# Patient Record
Sex: FEMALE | Race: WHITE | NOT HISPANIC OR LATINO
[De-identification: names, ages, dates, MRNs, and addresses within clinical notes are randomized per-mention and may not be internally consistent; named-entity substitution may affect disease eponyms.]

---

## 2024-05-24 ENCOUNTER — APPOINTMENT (OUTPATIENT)
Dept: INTERNAL MEDICINE | Facility: CLINIC | Age: 29
End: 2024-05-24
Payer: COMMERCIAL

## 2024-05-24 VITALS
TEMPERATURE: 97.3 F | HEIGHT: 72 IN | HEART RATE: 68 BPM | OXYGEN SATURATION: 98 % | DIASTOLIC BLOOD PRESSURE: 68 MMHG | WEIGHT: 218 LBS | SYSTOLIC BLOOD PRESSURE: 110 MMHG | BODY MASS INDEX: 29.53 KG/M2

## 2024-05-24 DIAGNOSIS — H66.90 OTITIS MEDIA, UNSPECIFIED, UNSPECIFIED EAR: ICD-10-CM

## 2024-05-24 PROCEDURE — 99203 OFFICE O/P NEW LOW 30 MIN: CPT

## 2024-05-24 NOTE — PLAN
[FreeTextEntry1] : 29F PMH depression presents for left ear pain and pressure starting 2 days ago.   # otitis media - 2 days with left ear pain and pressure and 2 weeks of sore throat - on exam with L ear TM appearing congested from behind, mild erythema around the ear canal, no signs of TM perforation or drainage, R ear normal. Mild erythematous mucus membranes, no exudates noted. No LN - she denies any hearing loss or pain on ear palpation or being in a body of water - denies fevers, chills, myalgias - can be coinciding with seasonal allergies but given duration of her symptoms and history of ear infection in the past will prescribe antibiotics - rx sent for augmentin BID for 7 days - recommended antihistamines OTC for allergies as needed - Tylenol/Advil recommended for pain/fevers

## 2024-05-24 NOTE — REVIEW OF SYSTEMS
[Earache] : earache [Hearing Loss] : no hearing loss [Nosebleed] : no nosebleeds [Hoarseness] : hoarseness [Nasal Discharge] : no nasal discharge [Sore Throat] : sore throat [Postnasal Drip] : postnasal drip [Negative] : Respiratory [FreeTextEntry4] : left ear pain and pressure

## 2024-05-24 NOTE — HEALTH RISK ASSESSMENT
[Yes] : Yes [2 - 4 times a month (2 pts)] : 2-4 times a month (2 points) [3 or 4 (1 pt)] : 3 or 4  (1 point) [Never (0 pts)] : Never (0 points) [No] : In the past 12 months have you used drugs other than those required for medical reasons? No [No falls in past year] : Patient reported no falls in the past year [0] : 1) Little interest or pleasure doing things: Not at all (0) [1] : 2) Feeling down, depressed, or hopeless for several days (1) [PHQ-2 Negative - No further assessment needed] : PHQ-2 Negative - No further assessment needed [Audit-CScore] : 3 [ZQC2Oxxby] : 1 [Never] : Never

## 2024-05-24 NOTE — HISTORY OF PRESENT ILLNESS
[FreeTextEntry8] : 29F PMH depression presents for left ear pain and pressure starting 2 days ago. She notes having a sore throat for 2 weeks and attributed it to allergies but 2 days ago started to left ear pain and pressure and now feeling fluid in her ear with no reported drainage. She notes she thinks she had an ear infection in 12/2023 which lasted a month because she was uninsured and was never treated with antibiotics. She denies any hearing loss, pain in ear, being in bodies of water, fevers, chills, chest pain. She reports a sick contact being her father with URI symptoms but no ear pain. She moved to [place] from [place] 5 months ago to be closer to her father. She reports seasonal allergies but has not been taking antihistamines. She denies any difficulty breathing or swallowing and says the sore throat has been resolving. She has been taking Advil PRN.

## 2024-05-24 NOTE — PHYSICAL EXAM
[Normal] : affect was normal and insight and judgment were intact [de-identified] : L ear TM appearing congested from behind, mild erythema around the ear canal, no signs of TM perforation or drainage, R ear normal. Mild erythematous mucus membranes, no exudates noted. No LN

## 2024-06-05 ENCOUNTER — NON-APPOINTMENT (OUTPATIENT)
Age: 29
End: 2024-06-05

## 2024-06-05 ENCOUNTER — APPOINTMENT (OUTPATIENT)
Dept: OBGYN | Facility: CLINIC | Age: 29
End: 2024-06-05
Payer: COMMERCIAL

## 2024-06-05 VITALS
SYSTOLIC BLOOD PRESSURE: 116 MMHG | WEIGHT: 223 LBS | HEIGHT: 72 IN | DIASTOLIC BLOOD PRESSURE: 62 MMHG | BODY MASS INDEX: 30.2 KG/M2

## 2024-06-05 DIAGNOSIS — F31.70 BIPOLAR DISORDER, CURRENTLY IN REMISSION, MOST RECENT EPISODE UNSPECIFIED: ICD-10-CM

## 2024-06-05 DIAGNOSIS — Z78.9 OTHER SPECIFIED HEALTH STATUS: ICD-10-CM

## 2024-06-05 DIAGNOSIS — Z11.3 ENCOUNTER FOR SCREENING FOR INFECTIONS WITH A PREDOMINANTLY SEXUAL MODE OF TRANSMISSION: ICD-10-CM

## 2024-06-05 DIAGNOSIS — Z01.419 ENCOUNTER FOR GYNECOLOGICAL EXAMINATION (GENERAL) (ROUTINE) W/OUT ABNORMAL FINDINGS: ICD-10-CM

## 2024-06-05 DIAGNOSIS — Z80.51 FAMILY HISTORY OF MALIGNANT NEOPLASM OF KIDNEY: ICD-10-CM

## 2024-06-05 DIAGNOSIS — Z12.4 ENCOUNTER FOR SCREENING FOR MALIGNANT NEOPLASM OF CERVIX: ICD-10-CM

## 2024-06-05 PROCEDURE — 99395 PREV VISIT EST AGE 18-39: CPT

## 2024-06-05 PROCEDURE — 99459 PELVIC EXAMINATION: CPT

## 2024-06-05 RX ORDER — VILAZODONE HYDROCHLORIDE 40 MG/1
40 TABLET, FILM COATED ORAL
Refills: 0 | Status: ACTIVE | COMMUNITY

## 2024-06-05 RX ORDER — AMOXICILLIN AND CLAVULANATE POTASSIUM 875; 125 MG/1; MG/1
875-125 TABLET, COATED ORAL
Qty: 14 | Refills: 0 | Status: DISCONTINUED | COMMUNITY
Start: 2024-05-24 | End: 2024-06-05

## 2024-06-05 NOTE — HISTORY OF PRESENT ILLNESS
[Patient reported PAP Smear was normal] : Patient reported PAP Smear was normal [Y] : Patient is sexually active [N] : Patient denies prior pregnancies [Menarche Age: ____] : age at menarche was [unfilled] [Yes] : Patient has concerns regarding sex [Currently Active] : currently active [Men] : men [TextBox_4] : Idalia is here for her annual exam and to establish care. She was referred by her friend Do Vargas.  She is originally from [place], lived in [place] for 6 years, now back to be near her father, who broke his hip recently. Her mother  from kidney cancer 5 years ago.    She has had sex only twice- once in January, and the second time in March.  During the March encounter she bled for a few minutes after sex. She thinks she tore her hymen.  She had a pap in - reportedly normal.     [PapSmeardate] : 01/2022 [TextBox_31] : as per pt  [LMPDate] : 5/16/24 [PGHxTotal] : 0 [FreeTextEntry1] : 5/16/24

## 2024-06-05 NOTE — PHYSICAL EXAM
[Appropriately responsive] : appropriately responsive [Alert] : alert [No Acute Distress] : no acute distress [Oriented x3] : oriented x3 [Chaperone Present] : A chaperone was present in the examining room during all aspects of the physical examination [65052] : A chaperone was present during the pelvic exam. [FreeTextEntry2] : IBIS Candelario [Soft] : soft [Non-tender] : non-tender [Non-distended] : non-distended [No Lesions] : no lesions [No Mass] : no mass [Examination Of The Breasts] : a normal appearance [No Masses] : no breast masses were palpable [Labia Majora] : normal [Labia Minora] : normal [Normal] : normal [Uterine Adnexae] : normal

## 2024-06-05 NOTE — PLAN
[FreeTextEntry1] : She was reassured her vaginal exam is normal.  The bleeding she had was likely from a small tear in the hymen. Should she experience any further bleeding with sex she was recommended to follow up, although she states she will likely not have sex again.  pap obtained along with gc.ct  self breast exams/awareness encouraged  pt has planned GI follow up.  Father has polyps, she also feels she has "GI issues".    she is motivated to be healthy and up to date with all screening as her mother did not go to the doctor regularly.

## 2024-06-06 LAB
C TRACH RRNA SPEC QL NAA+PROBE: NOT DETECTED
HPV HIGH+LOW RISK DNA PNL CVX: NOT DETECTED
N GONORRHOEA RRNA SPEC QL NAA+PROBE: NOT DETECTED
SOURCE TP AMPLIFICATION: NORMAL

## 2024-06-12 LAB — CYTOLOGY CVX/VAG DOC THIN PREP: NORMAL

## 2024-06-13 ENCOUNTER — APPOINTMENT (OUTPATIENT)
Dept: FAMILY MEDICINE | Facility: CLINIC | Age: 29
End: 2024-06-13
Payer: COMMERCIAL

## 2024-06-13 ENCOUNTER — NON-APPOINTMENT (OUTPATIENT)
Age: 29
End: 2024-06-13

## 2024-06-13 VITALS
SYSTOLIC BLOOD PRESSURE: 110 MMHG | BODY MASS INDEX: 30.2 KG/M2 | OXYGEN SATURATION: 97 % | WEIGHT: 223 LBS | TEMPERATURE: 97.3 F | DIASTOLIC BLOOD PRESSURE: 70 MMHG | HEART RATE: 63 BPM | HEIGHT: 72 IN

## 2024-06-13 DIAGNOSIS — Z13.220 ENCOUNTER FOR SCREENING FOR LIPOID DISORDERS: ICD-10-CM

## 2024-06-13 DIAGNOSIS — Z00.00 ENCOUNTER FOR GENERAL ADULT MEDICAL EXAMINATION W/OUT ABNORMAL FINDINGS: ICD-10-CM

## 2024-06-13 DIAGNOSIS — Z13.1 ENCOUNTER FOR SCREENING FOR DIABETES MELLITUS: ICD-10-CM

## 2024-06-13 DIAGNOSIS — Z82.49 FAMILY HISTORY OF ISCHEMIC HEART DISEASE AND OTHER DISEASES OF THE CIRCULATORY SYSTEM: ICD-10-CM

## 2024-06-13 DIAGNOSIS — Z13.29 ENCOUNTER FOR SCREENING FOR OTHER SUSPECTED ENDOCRINE DISORDER: ICD-10-CM

## 2024-06-13 PROCEDURE — 99385 PREV VISIT NEW AGE 18-39: CPT

## 2024-06-13 RX ORDER — CARIPRAZINE 1.5 MG/1
1.5 CAPSULE, GELATIN COATED ORAL
Refills: 0 | Status: ACTIVE | COMMUNITY

## 2024-06-13 NOTE — HISTORY OF PRESENT ILLNESS
[FreeTextEntry1] : cpe  [de-identified] : no chest pain, no sob, no cough, no fever, no dizziness, no abdominal pain, no n/v/d/c/melena/brbpr/hematuria/dysuria

## 2024-06-13 NOTE — HEALTH RISK ASSESSMENT
[Good] : ~his/her~  mood as  good [Yes] : Yes [Monthly or less (1 pt)] : Monthly or less (1 point) [1 or 2 (0 pts)] : 1 or 2 (0 points) [Never (0 pts)] : Never (0 points) [No] : In the past 12 months have you used drugs other than those required for medical reasons? No [None] : None [With Family] : lives with family [Employed] : employed [College] : College [Single] : single [Sexually Active] : sexually active [Feels Safe at Home] : Feels safe at home [Fully functional (bathing, dressing, toileting, transferring, walking, feeding)] : Fully functional (bathing, dressing, toileting, transferring, walking, feeding) [Fully functional (using the telephone, shopping, preparing meals, housekeeping, doing laundry, using] : Fully functional and needs no help or supervision to perform IADLs (using the telephone, shopping, preparing meals, housekeeping, doing laundry, using transportation, managing medications and managing finances) [Never] : Never [Reports changes in hearing] : Reports no changes in hearing [Reports changes in vision] : Reports no changes in vision [Reports changes in dental health] : Reports no changes in dental health [PapSmearDate] : 2024 [FreeTextEntry2] : AAA

## 2024-06-16 ENCOUNTER — TRANSCRIPTION ENCOUNTER (OUTPATIENT)
Age: 29
End: 2024-06-16

## 2024-06-22 LAB
ALBUMIN SERPL ELPH-MCNC: 4.6 G/DL
ALP BLD-CCNC: 49 U/L
ALT SERPL-CCNC: 13 U/L
ANION GAP SERPL CALC-SCNC: 10 MMOL/L
APPEARANCE: CLEAR
AST SERPL-CCNC: 18 U/L
BACTERIA: NEGATIVE /HPF
BASOPHILS # BLD AUTO: 0.06 K/UL
BASOPHILS NFR BLD AUTO: 1.1 %
BILIRUB SERPL-MCNC: 0.4 MG/DL
BILIRUBIN URINE: NEGATIVE
BLOOD URINE: NEGATIVE
BUN SERPL-MCNC: 14 MG/DL
CALCIUM SERPL-MCNC: 9.3 MG/DL
CAST: 1 /LPF
CHLORIDE SERPL-SCNC: 103 MMOL/L
CHOLEST SERPL-MCNC: 224 MG/DL
CO2 SERPL-SCNC: 26 MMOL/L
COLOR: YELLOW
CREAT SERPL-MCNC: 0.84 MG/DL
EGFR: 96 ML/MIN/1.73M2
EOSINOPHIL # BLD AUTO: 0.08 K/UL
EOSINOPHIL NFR BLD AUTO: 1.4 %
EPITHELIAL CELLS: 4 /HPF
ESTIMATED AVERAGE GLUCOSE: 105 MG/DL
GLUCOSE QUALITATIVE U: NEGATIVE MG/DL
GLUCOSE SERPL-MCNC: 89 MG/DL
HBA1C MFR BLD HPLC: 5.3 %
HCT VFR BLD CALC: 38.3 %
HDLC SERPL-MCNC: 66 MG/DL
HGB BLD-MCNC: 12.4 G/DL
IMM GRANULOCYTES NFR BLD AUTO: 0.9 %
KETONES URINE: NEGATIVE MG/DL
LDLC SERPL CALC-MCNC: 147 MG/DL
LEUKOCYTE ESTERASE URINE: NEGATIVE
LYMPHOCYTES # BLD AUTO: 1.2 K/UL
LYMPHOCYTES NFR BLD AUTO: 21.7 %
MAN DIFF?: NORMAL
MCHC RBC-ENTMCNC: 29.2 PG
MCHC RBC-ENTMCNC: 32.4 GM/DL
MCV RBC AUTO: 90.3 FL
MICROSCOPIC-UA: NORMAL
MONOCYTES # BLD AUTO: 0.45 K/UL
MONOCYTES NFR BLD AUTO: 8.1 %
NEUTROPHILS # BLD AUTO: 3.7 K/UL
NEUTROPHILS NFR BLD AUTO: 66.8 %
NITRITE URINE: NEGATIVE
NONHDLC SERPL-MCNC: 157 MG/DL
PH URINE: 7.5
PLATELET # BLD AUTO: 334 K/UL
POTASSIUM SERPL-SCNC: 4.2 MMOL/L
PROT SERPL-MCNC: 6.7 G/DL
PROTEIN URINE: NEGATIVE MG/DL
RBC # BLD: 4.24 M/UL
RBC # FLD: 13.2 %
RED BLOOD CELLS URINE: 2 /HPF
SODIUM SERPL-SCNC: 138 MMOL/L
SPECIFIC GRAVITY URINE: 1.02
TRIGL SERPL-MCNC: 60 MG/DL
TSH SERPL-ACNC: 2.79 UIU/ML
UROBILINOGEN URINE: 0.2 MG/DL
WBC # FLD AUTO: 5.54 K/UL
WHITE BLOOD CELLS URINE: 0 /HPF

## 2025-06-20 ENCOUNTER — APPOINTMENT (OUTPATIENT)
Dept: OBGYN | Facility: CLINIC | Age: 30
End: 2025-06-20
Payer: COMMERCIAL

## 2025-06-20 VITALS
HEIGHT: 72 IN | DIASTOLIC BLOOD PRESSURE: 70 MMHG | WEIGHT: 225 LBS | SYSTOLIC BLOOD PRESSURE: 120 MMHG | BODY MASS INDEX: 30.48 KG/M2

## 2025-06-20 PROBLEM — L68.0 FEMALE HIRSUTISM: Status: ACTIVE | Noted: 2025-06-20

## 2025-06-20 PROCEDURE — 99395 PREV VISIT EST AGE 18-39: CPT

## 2025-06-20 PROCEDURE — 36415 COLL VENOUS BLD VENIPUNCTURE: CPT

## 2025-06-20 RX ORDER — TIRZEPATIDE 5 MG/.5ML
5 INJECTION, SOLUTION SUBCUTANEOUS
Refills: 0 | Status: ACTIVE | COMMUNITY

## 2025-06-27 ENCOUNTER — TRANSCRIPTION ENCOUNTER (OUTPATIENT)
Age: 30
End: 2025-06-27

## 2025-06-27 LAB
17OHP SERPL-MCNC: 20 NG/DL
ANDROST SERPL-MCNC: 59 NG/DL
ANTI-MUELLERIAN HORMONE: 3.94 NG/ML
DHEA-S SERPL-MCNC: 320 UG/DL
ESTRADIOL SERPL-MCNC: 29 PG/ML
FSH SERPL-MCNC: 5.6 IU/L
LH SERPL-ACNC: 8.2 IU/L
PROLACTIN SERPL-MCNC: 25.3 NG/ML
SHBG SERPL-SCNC: 44.4 NMOL/L
TESTOST FREE SERPL-MCNC: 0.6 PG/ML
TESTOST SERPL-MCNC: 30.3 NG/DL
TSH SERPL-ACNC: 1.27 UIU/ML

## 2025-07-18 ENCOUNTER — APPOINTMENT (OUTPATIENT)
Dept: OBGYN | Facility: CLINIC | Age: 30
End: 2025-07-18
Payer: COMMERCIAL

## 2025-07-18 PROCEDURE — 36415 COLL VENOUS BLD VENIPUNCTURE: CPT

## 2025-07-25 ENCOUNTER — ASOB RESULT (OUTPATIENT)
Age: 30
End: 2025-07-25

## 2025-07-25 ENCOUNTER — APPOINTMENT (OUTPATIENT)
Dept: OBGYN | Facility: CLINIC | Age: 30
End: 2025-07-25
Payer: COMMERCIAL

## 2025-07-25 ENCOUNTER — APPOINTMENT (OUTPATIENT)
Dept: ANTEPARTUM | Facility: CLINIC | Age: 30
End: 2025-07-25
Payer: COMMERCIAL

## 2025-07-25 VITALS
SYSTOLIC BLOOD PRESSURE: 122 MMHG | BODY MASS INDEX: 30.48 KG/M2 | HEIGHT: 72 IN | DIASTOLIC BLOOD PRESSURE: 74 MMHG | WEIGHT: 225 LBS

## 2025-07-25 DIAGNOSIS — R79.89 OTHER SPECIFIED ABNORMAL FINDINGS OF BLOOD CHEMISTRY: ICD-10-CM

## 2025-07-25 DIAGNOSIS — Z71.2 PERSON CONSULTING FOR EXPLANATION OF EXAMINATION OR TEST FINDINGS: ICD-10-CM

## 2025-07-25 PROCEDURE — 76830 TRANSVAGINAL US NON-OB: CPT

## 2025-07-25 PROCEDURE — 76856 US EXAM PELVIC COMPLETE: CPT | Mod: 59

## 2025-07-25 PROCEDURE — 99213 OFFICE O/P EST LOW 20 MIN: CPT
